# Patient Record
Sex: MALE | Race: WHITE
[De-identification: names, ages, dates, MRNs, and addresses within clinical notes are randomized per-mention and may not be internally consistent; named-entity substitution may affect disease eponyms.]

---

## 2021-07-20 PROBLEM — Z00.00 ENCOUNTER FOR PREVENTIVE HEALTH EXAMINATION: Status: ACTIVE | Noted: 2021-07-20

## 2021-07-21 ENCOUNTER — APPOINTMENT (OUTPATIENT)
Dept: NEPHROLOGY | Facility: CLINIC | Age: 21
End: 2021-07-21

## 2023-03-29 ENCOUNTER — NON-APPOINTMENT (OUTPATIENT)
Age: 23
End: 2023-03-29

## 2023-03-29 ENCOUNTER — APPOINTMENT (OUTPATIENT)
Dept: NEUROLOGY | Facility: CLINIC | Age: 23
End: 2023-03-29
Payer: MEDICAID

## 2023-03-29 VITALS — HEIGHT: 77.56 IN

## 2023-03-29 VITALS
WEIGHT: 292.19 LBS | TEMPERATURE: 97 F | SYSTOLIC BLOOD PRESSURE: 135 MMHG | BODY MASS INDEX: 34.15 KG/M2 | DIASTOLIC BLOOD PRESSURE: 91 MMHG | HEART RATE: 100 BPM | OXYGEN SATURATION: 97 %

## 2023-03-29 DIAGNOSIS — Z83.3 FAMILY HISTORY OF DIABETES MELLITUS: ICD-10-CM

## 2023-03-29 PROCEDURE — 99204 OFFICE O/P NEW MOD 45 MIN: CPT

## 2023-03-29 NOTE — DISCUSSION/SUMMARY
[FreeTextEntry1] : Impression:\par 1) History of ADHD on Adderall for many years. Elevated BP and HR possibly side effects from long-term use\par \par Plan:\par 1) Decrease total daily dose of Adderall to 25mg-35mg daily. Add Atomoxetine 20mg \par 2) Follow up in 1 month

## 2023-03-29 NOTE — HISTORY OF PRESENT ILLNESS
[FreeTextEntry1] : Flaco is a 22 year old male presenting to establish care for ADHD.\par \par Diagnosed at age 10, followed with psychiatrist for many years who is now not covered by his insurance. Notes difficulties focusing on tasks and with organization for most of his life. Currently taking Adderall 30mg XR and 10mg XR. Has been on this regimen since age 15. Previously tried Ritalin and Concerta.\par \par No new complaints, notes the Adderall works well for him. Does not take the full 30/10 every day. Usually takes 30mg daily, and if he has class will take the extra 10mg. Claims he is not aware of side effects. No changes in mood. Sleep isn't good, but believes it is due to him staying up too late watching TV and not due to the Adderall. BP and HR on the higher end of normal in the office today. Unsure if vitals are usually high, but admits he does not exercise. \par \par No PMH. Taking Adderall and also Accutane for acne. \par \par Notes he believes his entire Dad's side of the family and his brother also has ADHD but undiagnosed. 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

## 2023-03-29 NOTE — REVIEW OF SYSTEMS
[Fever] : no fever [Chills] : no chills [Sleep Disturbances] : sleep disturbances [As Noted in HPI] : as noted in HPI

## 2023-03-29 NOTE — PHYSICAL EXAM
[FreeTextEntry1] : General:\par Constitutional: Sitting comfortably in NAD\par Psychiatric: well-groomed, appropriate affect, insight/judgement intact\par Ears, Nose, Throat: no abnormalities, mucus membranes moist\par Extremities: no edema, clubbing, or cyanosis\par Skin: no rash or neurocutaneous signs\par \par Cognitive:\par Orientation, language, memory and knowledge screens intact\par Cranial Nerves:\par II: Full to confrontation; disc margins sharp. III/IV/VI: PERRL EOMI, no nystagmus\par [id number]: Symmetric. VII: Face appears symmetric. VIII: Normal to screening, IX/X: Palate elevates symmetrical. XI: Trapezius symmetric. XII: Tongue midline\par \par Motor: Power: 5/5 throughout\par Tone: Normal x4 limbs\par Tremor: Subtle tremor bilateral hands\par \par Sensation: intact to light touch\par \par Coordination/Gait:\par Finger-nose-finger intact, normal rapid-alternating movements\par FIne motor normal with normal rapid finger taps and heel tapping\par Heel and toe walking normal\par Romberg negative\par \par Reflexes:\par DTR: 2+ symmetric x4 limbs\par \par

## 2023-05-02 ENCOUNTER — TRANSCRIPTION ENCOUNTER (OUTPATIENT)
Age: 23
End: 2023-05-02

## 2023-05-17 ENCOUNTER — APPOINTMENT (OUTPATIENT)
Dept: NEUROLOGY | Facility: CLINIC | Age: 23
End: 2023-05-17
Payer: MEDICAID

## 2023-05-17 PROCEDURE — 99212 OFFICE O/P EST SF 10 MIN: CPT | Mod: 95

## 2023-05-17 NOTE — REVIEW OF SYSTEMS
[As Noted in HPI] : as noted in HPI [Fever] : no fever [Chills] : no chills [Chest Pain] : no chest pain [Palpitations] : no palpitations

## 2023-05-17 NOTE — PHYSICAL EXAM
[FreeTextEntry1] : General:\par Constitutional: Sitting comfortably in NAD.\par Psychiatric: well-groomed, appropriate affect\par \par Cognitive:\par Orientation, language, memory and knowledge screens intact.\par \par Cranial Nerves:\par II: EDUAR. III/IV/VI: EOM Full. VII: Face appears symmetric. VIII: Normal to screening. IX/X: Normal phonation. XI: Trapezius Symmetric. XII: Tongue midline.\par

## 2023-05-17 NOTE — DISCUSSION/SUMMARY
[FreeTextEntry1] : Impression:\par 1) History of ADHD on Adderall for many years. Recently started Atomoxetine 20mg and cut Adderall to 20mg daily\par 2) Elevated BP and HR at prior appointment - possibly related to long-term Adderall use\par \par Plan:\par 1) Continue Atomoxetine 20mg and Adderall 20mg daily\par 2) Follow up in person in about 1 month, consider going up on Atomoxetine if he notes benefit. Also will recheck BP and HR on lower dose of Adderall

## 2023-05-17 NOTE — HISTORY OF PRESENT ILLNESS
[FreeTextEntry1] : 5/17/23 HPI:\par \par Flaco is a 22 year old male presenting for a follow up for ADHD.\par \par Started Atomoxetine 20mg about two weeks ago. Has not noticed effects just yet. Cut down on Adderall to 20mg daily. Thinks he could use a bit extra, maybe 25mg especially during early morning classes, but notes it is manageable for now.\par \par Final exams this week, but still believes he is managing well on the lower dose of Adderall. Will try to cut Adderall down to 10mg daily over the summer. \par \par \par Prior:\par Diagnosed at age 10, followed with psychiatrist for many years who is now not covered by his insurance. Notes difficulties focusing on tasks and with organization for most of his life. Currently taking Adderall 30mg XR and 10mg XR. Has been on this regimen since age 15. Previously tried Ritalin and Concerta.\par \par No new complaints, notes the Adderall works well for him. Does not take the full 30/10 every day. Usually takes 30mg daily, and if he has class will take the extra 10mg. Claims he is not aware of side effects. No changes in mood. Sleep isn't good, but believes it is due to him staying up too late watching TV and not due to the Adderall. BP and HR on the higher end of normal in the office today. Unsure if vitals are usually high, but admits he does not exercise. \par \par No PMH. Taking Adderall and also Accutane for acne. \par \par Notes he believes his entire Dad's side of the family and his brother also has ADHD but undiagnosed. 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

## 2023-06-19 ENCOUNTER — TRANSCRIPTION ENCOUNTER (OUTPATIENT)
Age: 23
End: 2023-06-19

## 2023-06-21 ENCOUNTER — TRANSCRIPTION ENCOUNTER (OUTPATIENT)
Age: 23
End: 2023-06-21

## 2023-07-03 ENCOUNTER — APPOINTMENT (OUTPATIENT)
Dept: NEUROLOGY | Facility: CLINIC | Age: 23
End: 2023-07-03
Payer: MEDICAID

## 2023-07-03 VITALS
OXYGEN SATURATION: 96 % | HEART RATE: 119 BPM | WEIGHT: 279 LBS | TEMPERATURE: 97.6 F | BODY MASS INDEX: 32.94 KG/M2 | DIASTOLIC BLOOD PRESSURE: 78 MMHG | SYSTOLIC BLOOD PRESSURE: 118 MMHG | HEIGHT: 77 IN

## 2023-07-03 DIAGNOSIS — F90.9 ATTENTION-DEFICIT HYPERACTIVITY DISORDER, UNSPECIFIED TYPE: ICD-10-CM

## 2023-07-03 PROCEDURE — 99214 OFFICE O/P EST MOD 30 MIN: CPT

## 2023-07-03 RX ORDER — ATOMOXETINE 40 MG/1
40 CAPSULE ORAL
Qty: 60 | Refills: 2 | Status: ACTIVE | COMMUNITY
Start: 2023-03-29 | End: 1900-01-01

## 2023-07-03 NOTE — DISCUSSION/SUMMARY
[FreeTextEntry1] : Impression:\par 1) ADHD, focus improving on adderall, but still difficulty with overall planning, no difference on atomoxetine at 20mg/d\par \par Plan:\par 1) push up atomoxetine to 40mg, and if no improvements noted, push to 80mg.

## 2023-07-03 NOTE — PHYSICAL EXAM
[FreeTextEntry1] : General:\par Constitutional:  Sitting comfortably in NAD.\par Psychiatric: well-groomed, appropriate affect\par Ears, Nose, Throat: no abnormalities, mucus membranes moist\par Neck: supple\par Extremities: no edema, clubbing or cyanosis\par Skin: no rash or neuro-cutaneous signs \par \par Cognitive:\par Orientation, language, memory and knowledge screens intact.\par \par Cranial Nerves:\par II: EDUAR. III/IV/VI: EOM Full.  VII: Face appears symmetric VIII: Normal to screening\par IX/X: normal phonation  XI: Trapezius Symmetric  XII: Tongue midline\par Motor:\par Power: no pronator drift\par \par Narrow based gait\par No tremors, fine motor intact

## 2023-07-03 NOTE — HISTORY OF PRESENT ILLNESS
[FreeTextEntry1] : \janna Sam is a 22 year old male presenting for a follow up for ADHD.\par \par Atomoxetine 20mg remains as adjunct to adderall, varying between 25 and 35mg/d.  This helps his focus.\par Has not noticed effects just yet.\par \par \par Prior:\par Diagnosed at age 10, followed with psychiatrist for many years who is now not covered by his insurance. Notes difficulties focusing on tasks and with organization for most of his life. Currently taking Adderall 30mg XR and 10mg XR. Has been on this regimen since age 15. Previously tried Ritalin and Concerta.\par \par No new complaints, notes the Adderall works well for him. Does not take the full 30/10 every day. Usually takes 30mg daily, and if he has class will take the extra 10mg. Claims he is not aware of side effects. No changes in mood. Sleep isn't good, but believes it is due to him staying up too late watching TV and not due to the Adderall. BP and HR on the higher end of normal in the office today. Unsure if vitals are usually high, but admits he does not exercise. \par \par No PMH. Taking Adderall and also Accutane for acne. \par \par Notes he believes his entire Dad's side of the family and his brother also has ADHD but undiagnosed. 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

## 2023-08-02 ENCOUNTER — TRANSCRIPTION ENCOUNTER (OUTPATIENT)
Age: 23
End: 2023-08-02

## 2023-09-11 ENCOUNTER — TRANSCRIPTION ENCOUNTER (OUTPATIENT)
Age: 23
End: 2023-09-11

## 2023-09-29 ENCOUNTER — TRANSCRIPTION ENCOUNTER (OUTPATIENT)
Age: 23
End: 2023-09-29

## 2023-10-19 ENCOUNTER — TRANSCRIPTION ENCOUNTER (OUTPATIENT)
Age: 23
End: 2023-10-19

## 2023-11-29 ENCOUNTER — TRANSCRIPTION ENCOUNTER (OUTPATIENT)
Age: 23
End: 2023-11-29

## 2024-01-09 ENCOUNTER — TRANSCRIPTION ENCOUNTER (OUTPATIENT)
Age: 24
End: 2024-01-09

## 2024-01-10 ENCOUNTER — TRANSCRIPTION ENCOUNTER (OUTPATIENT)
Age: 24
End: 2024-01-10

## 2024-01-10 RX ORDER — DEXTROAMPHETAMINE SACCHARATE, AMPHETAMINE ASPARTATE MONOHYDRATE, DEXTROAMPHETAMINE SULFATE AND AMPHETAMINE SULFATE 2.5; 2.5; 2.5; 2.5 MG/1; MG/1; MG/1; MG/1
10 CAPSULE, EXTENDED RELEASE ORAL
Qty: 90 | Refills: 0 | Status: ACTIVE | COMMUNITY
Start: 1900-01-01 | End: 1900-01-01

## 2024-01-10 RX ORDER — DEXTROAMPHETAMINE SACCHARATE, AMPHETAMINE ASPARTATE MONOHYDRATE, DEXTROAMPHETAMINE SULFATE AND AMPHETAMINE SULFATE 6.25; 6.25; 6.25; 6.25 MG/1; MG/1; MG/1; MG/1
25 CAPSULE, EXTENDED RELEASE ORAL
Qty: 90 | Refills: 0 | Status: ACTIVE | COMMUNITY
Start: 1900-01-01 | End: 1900-01-01